# Patient Record
Sex: FEMALE
[De-identification: names, ages, dates, MRNs, and addresses within clinical notes are randomized per-mention and may not be internally consistent; named-entity substitution may affect disease eponyms.]

---

## 2019-10-03 ENCOUNTER — APPOINTMENT (OUTPATIENT)
Dept: ENDOCRINOLOGY | Facility: CLINIC | Age: 64
End: 2019-10-03
Payer: MEDICARE

## 2019-10-03 VITALS
WEIGHT: 119 LBS | BODY MASS INDEX: 21.09 KG/M2 | HEART RATE: 69 BPM | TEMPERATURE: 98 F | RESPIRATION RATE: 16 BRPM | OXYGEN SATURATION: 97 % | HEIGHT: 63 IN | SYSTOLIC BLOOD PRESSURE: 125 MMHG | DIASTOLIC BLOOD PRESSURE: 63 MMHG

## 2019-10-03 DIAGNOSIS — Z86.39 PERSONAL HISTORY OF OTHER ENDOCRINE, NUTRITIONAL AND METABOLIC DISEASE: ICD-10-CM

## 2019-10-03 DIAGNOSIS — L65.9 NONSCARRING HAIR LOSS, UNSPECIFIED: ICD-10-CM

## 2019-10-03 DIAGNOSIS — Z78.9 OTHER SPECIFIED HEALTH STATUS: ICD-10-CM

## 2019-10-03 PROCEDURE — 99204 OFFICE O/P NEW MOD 45 MIN: CPT

## 2019-10-03 RX ORDER — SIMVASTATIN 40 MG/1
40 TABLET, FILM COATED ORAL
Refills: 0 | Status: ACTIVE | COMMUNITY

## 2019-10-03 RX ORDER — LEVOTHYROXINE SODIUM 0.09 MG/1
88 TABLET ORAL
Refills: 0 | Status: ACTIVE | COMMUNITY

## 2019-10-03 NOTE — PHYSICAL EXAM
[Alert] : alert [No Acute Distress] : no acute distress [Well Nourished] : well nourished [Well Developed] : well developed [Normal Sclera/Conjunctiva] : normal sclera/conjunctiva [EOMI] : extra ocular movement intact [No Proptosis] : no proptosis [Normal Oropharynx] : the oropharynx was normal [Thyroid Not Enlarged] : the thyroid was not enlarged [No Respiratory Distress] : no respiratory distress [No Thyroid Nodules] : there were no palpable thyroid nodules [No Accessory Muscle Use] : no accessory muscle use [Clear to Auscultation] : lungs were clear to auscultation bilaterally [Normal Rate] : heart rate was normal  [Normal S1, S2] : normal S1 and S2 [Pedal Pulses Normal] : the pedal pulses are present [Regular Rhythm] : with a regular rhythm [No Edema] : there was no peripheral edema [Normal Bowel Sounds] : normal bowel sounds [Not Tender] : non-tender [Soft] : abdomen soft [Not Distended] : not distended [Post Cervical Nodes] : posterior cervical nodes [Axillary Nodes] : axillary nodes [Anterior Cervical Nodes] : anterior cervical nodes [Normal] : normal and non tender [Spine Straight] : spine straight [No Spinal Tenderness] : no spinal tenderness [No Stigmata of Cushings Syndrome] : no stigmata of cushings syndrome [Normal Gait] : normal gait [Normal Strength/Tone] : muscle strength and tone were normal [No Rash] : no rash [Normal Reflexes] : deep tendon reflexes were 2+ and symmetric [No Tremors] : no tremors [Oriented x3] : oriented to person, place, and time [Acanthosis Nigricans] : no acanthosis nigricans

## 2019-10-03 NOTE — CONSULT LETTER
[Dear  ___] : Dear  [unfilled], [FreeTextEntry1] : \par I had the pleasure of seeing your patient, VICENTE KUMAR, in consultation today for thyroid nodule. \par I am including my consult note for your review. If I can be of any assistance, please do not hesitate to contact me. Thank you for allowing me to participate in the care of your patient.\par \par Sincerely, \par \par Jamia Arredondo M.D.\par \par

## 2019-10-03 NOTE — HISTORY OF PRESENT ILLNESS
[FreeTextEntry1] : 64 year old female from [facility] with PMH of hypothyroidism (dx 3 weeks ago), thyroid nodule, dextrocardia, and HLD was referred for evaluation of thyroid nodule.\par \par The patient states she was recently experiencing worsening hair loss, severe fatigue, dry skin, and cold intolerance. She has also noted difficulty swallowing solids. She was diagnosed with hypothyroidism in [facility] 3 weeks ago. She was started on levothyroxine 88mcg PO daily but has not noted any improvement in symptoms since starting on the medication. She denies constipation or weight changes.\par \par Recent thyroid ultrasound from 8/15/2019 showed a solid 4mm LMP thyroid nodule (TR4) that does not meet criteria for biopsy at this time. The patient states she underwent recent FNA in [facility] with benign results. \par \par She is currently taking Levothyroxine 88mcg PO daily. She reports strict compliance with the medication and is taking it appropriately first thing in the morning on an empty stomach, waits at least 1 hr to eat. She reports she is taking the medication with a multivitamin for skin and hair that contains calcium. \par \par Family history- denies family history of thyroid disease

## 2019-10-03 NOTE — ASSESSMENT
[FreeTextEntry1] : 1. Hypothyroidism\par -patient is clinically hypothyroid\par -no labs since starting levothyroxine 88mcg PO daily 3 weeks ago\par -will repeat TFTs in 2 weeks\par -advised patient to wait at least 4 hrs to take calcium, multivitamin, or iron supplement after levothyroxine \par -counseled patient on the importance of medication compliance and how to take the medication appropriately\par \par 2. Left Thyroid nodule\par -recent thyroid nodule significant for LMP 4mm thyroid nodule (TR4) that does not meet TIRADS criteria for FNA at this time, though patient states recent FNA in [facility] was benign\par -repeat thyroid ultrasound in 1 year\par -Unlikely that this subcentimeter thyroid nodule is contributing to symptoms of dysphagia. Consider GI evaluation for further investigation\par \par 3. HLD\par -counseled patient on low fat diet\par -continue statin

## 2019-10-03 NOTE — REVIEW OF SYSTEMS
[Fatigue] : fatigue [Dysphagia] : dysphagia [Hair Loss] : hair loss [Dry Skin] : dry skin [Cold Intolerance] : cold intolerant [Decreased Appetite] : appetite not decreased [Recent Weight Gain (___ Lbs)] : no recent weight gain [Recent Weight Loss (___ Lbs)] : no recent weight loss [Dry Eyes] : no dryness of the eyes [Eyes Itch] : no itching of the eyes [Dysphonia] : no dysphonia [Neck Pain] : no neck pain [Palpitations] : no palpitations [Chest Pain] : no chest pain [Shortness Of Breath] : no shortness of breath [Wheezing] : no wheezing was heard [Nausea] : no nausea [Cough] : no cough [Vomiting] : no vomiting was observed [Constipation] : no constipation [Diarrhea] : no diarrhea [Dysuria] : no dysuria [Incontinence] : no incontinence [Joint Pain] : no joint pain [Joint Stiffness] : no joint stiffness [Muscle Weakness] : no muscle weakness [Headache] : no headaches [Muscle Cramps] : no muscle cramps [Tremors] : no tremors [Dizziness] : no dizziness [Depression] : no depression [Heat Intolerance] : heat tolerant [Anxiety] : no anxiety [Swelling] : no swelling [Lymphadenopathy] : no lymphadenopathy

## 2020-01-10 ENCOUNTER — APPOINTMENT (OUTPATIENT)
Dept: ENDOCRINOLOGY | Facility: CLINIC | Age: 65
End: 2020-01-10
Payer: MEDICARE

## 2020-01-10 VITALS
RESPIRATION RATE: 16 BRPM | TEMPERATURE: 97.8 F | HEART RATE: 64 BPM | OXYGEN SATURATION: 97 % | HEIGHT: 63 IN | SYSTOLIC BLOOD PRESSURE: 142 MMHG | WEIGHT: 117 LBS | DIASTOLIC BLOOD PRESSURE: 80 MMHG | BODY MASS INDEX: 20.73 KG/M2

## 2020-01-10 DIAGNOSIS — E78.5 HYPERLIPIDEMIA, UNSPECIFIED: ICD-10-CM

## 2020-01-10 DIAGNOSIS — E03.9 HYPOTHYROIDISM, UNSPECIFIED: ICD-10-CM

## 2020-01-10 DIAGNOSIS — E04.1 NONTOXIC SINGLE THYROID NODULE: ICD-10-CM

## 2020-01-10 PROCEDURE — 99214 OFFICE O/P EST MOD 30 MIN: CPT

## 2020-01-10 NOTE — HISTORY OF PRESENT ILLNESS
[FreeTextEntry1] : 64 year old female from [facility] with PMH of hypothyroidism (dx 3 weeks ago), thyroid nodule, dextrocardia, and HLD presented for follow up of thyroid nodule.\par \par Blood work following last visit in 10/2019 was significant for suppressed TSH to 0.02. Levothyroxine dose was decreased at that time from 88mcg PO daily to 75mcg PO daily. She reports experiencing mild fatigue but has noted improvement in hair loss. She denies constipation, weight change, temperature intolerance, palpitations, or tremors. \par \par She reports strict compliance with the medication and is taking it appropriately first thing in the morning on an empty stomach, waits at least 1 hr to eat. She reports she is taking the medication with a multivitamin for skin and hair that contains calcium. \par \par Thyroid ultrasound from 8/15/2019 showed a solid 4mm LMP thyroid nodule (TR4) that does not meet criteria for biopsy at this time. The patient states she underwent recent FNA in [facility] with benign results. \par \par No change in FH or SH since last visit. \par

## 2020-01-10 NOTE — REVIEW OF SYSTEMS
[Visual Field Defect] : no visual field defect [Fatigue] : fatigue [Dysphonia] : no dysphonia [Blurry Vision] : no blurred vision [Dysphagia] : no dysphagia [Neck Pain] : no neck pain [Chest Pain] : no chest pain [Palpitations] : no palpitations [Shortness Of Breath] : no shortness of breath [Wheezing] : no wheezing was heard [Nausea] : no nausea [Cough] : no cough [Vomiting] : no vomiting was observed [Constipation] : no constipation [Dysuria] : no dysuria [Diarrhea] : no diarrhea [Incontinence] : no incontinence [Joint Stiffness] : no joint stiffness [Joint Pain] : no joint pain [Muscle Weakness] : no muscle weakness [Muscle Cramps] : no muscle cramps [Hair Loss] : hair loss [Dry Skin] : dry skin [Headache] : no headaches [Tremors] : no tremors [Dizziness] : no dizziness [Depression] : no depression [Anxiety] : no anxiety [Cold Intolerance] : cold tolerant [Heat Intolerance] : heat tolerant [Lymphadenopathy] : no lymphadenopathy [Swelling] : no swelling

## 2020-01-10 NOTE — ASSESSMENT
[FreeTextEntry1] : 1. Hypothyroidism\par -patient is clinically mildly hypothyroid though no recent blood work\par -repeat TFTs now, will adjust dose as needed \par -continue Levothyroxine 75mcg PO daily for now\par -counseled patient on importance of medication compliance and how to take the medication appropriately \par \par 2. Left thyroid nodule\par -thyroid ultrasound significant for LMP 4mm thyroid nodule (TR4) that does not meet TIRADS criteria for FNA at this time, though patient states recent FNA in [facility] was benign\par -repeat thyroid ultrasound annually 8/2020\par \par 3. HLD\par -continue statin\par -counseled patient on low fat diet \par -repeat lipid profile \par

## 2020-01-13 LAB
CHOLEST SERPL-MCNC: 243 MG/DL
CHOLEST/HDLC SERPL: 4.5 RATIO
HDLC SERPL-MCNC: 54 MG/DL
LDLC SERPL CALC-MCNC: 138 MG/DL
T4 FREE SERPL-MCNC: 0.9 NG/DL
TRIGL SERPL-MCNC: 257 MG/DL
TSH SERPL-ACNC: 2.2 UIU/ML

## 2020-01-13 RX ORDER — LEVOTHYROXINE SODIUM 0.07 MG/1
75 TABLET ORAL DAILY
Qty: 90 | Refills: 3 | Status: ACTIVE | COMMUNITY
Start: 2019-11-06 | End: 1900-01-01

## 2020-04-10 ENCOUNTER — APPOINTMENT (OUTPATIENT)
Dept: ENDOCRINOLOGY | Facility: CLINIC | Age: 65
End: 2020-04-10

## 2021-03-12 ENCOUNTER — NEW PATIENT (OUTPATIENT)
Dept: URBAN - METROPOLITAN AREA CLINIC 56 | Facility: CLINIC | Age: 66
End: 2021-03-12
Payer: MEDICARE

## 2021-03-12 DIAGNOSIS — H52.4 PRESBYOPIA: ICD-10-CM

## 2021-03-12 DIAGNOSIS — Z96.1 PRESENCE OF INTRAOCULAR LENS: ICD-10-CM

## 2021-03-12 DIAGNOSIS — H43.812 VITREOUS DEGENERATION, LEFT EYE: Primary | ICD-10-CM

## 2021-03-12 PROCEDURE — 92004 COMPRE OPH EXAM NEW PT 1/>: CPT | Performed by: OPTOMETRIST

## 2021-03-12 ASSESSMENT — KERATOMETRY
OD: 43.36
OS: 44.01

## 2021-03-12 ASSESSMENT — INTRAOCULAR PRESSURE
OS: 12
OD: 12

## 2021-03-12 ASSESSMENT — VISUAL ACUITY
OD: 20/20
OS: 20/20